# Patient Record
Sex: MALE | Race: WHITE | HISPANIC OR LATINO | Employment: UNEMPLOYED | URBAN - METROPOLITAN AREA
[De-identification: names, ages, dates, MRNs, and addresses within clinical notes are randomized per-mention and may not be internally consistent; named-entity substitution may affect disease eponyms.]

---

## 2018-03-11 ENCOUNTER — HOSPITAL ENCOUNTER (EMERGENCY)
Facility: HOSPITAL | Age: 33
Discharge: HOME | End: 2018-03-11

## 2018-03-11 VITALS
BODY MASS INDEX: 31.96 KG/M2 | RESPIRATION RATE: 20 BRPM | DIASTOLIC BLOOD PRESSURE: 71 MMHG | WEIGHT: 186.19 LBS | OXYGEN SATURATION: 95 % | SYSTOLIC BLOOD PRESSURE: 158 MMHG | HEART RATE: 93 BPM | TEMPERATURE: 99 F

## 2018-03-11 DIAGNOSIS — Z86.69 HISTORY OF ITCHING OF EYE: ICD-10-CM

## 2018-03-11 DIAGNOSIS — R03.0 ELEVATED BLOOD PRESSURE READING: ICD-10-CM

## 2018-03-11 DIAGNOSIS — H00.011 HORDEOLUM EXTERNUM OF RIGHT UPPER EYELID: Primary | ICD-10-CM

## 2018-03-11 PROCEDURE — 99283 EMERGENCY DEPT VISIT LOW MDM: CPT

## 2018-03-11 RX ORDER — POLYMYXIN B SULFATE AND TRIMETHOPRIM 1; 10000 MG/ML; [USP'U]/ML
1 SOLUTION OPHTHALMIC EVERY 4 HOURS
Qty: 10 ML | Refills: 0 | Status: SHIPPED | OUTPATIENT
Start: 2018-03-11 | End: 2018-03-21

## 2018-03-11 NOTE — ED PROVIDER NOTES
SCRIBE #1 NOTE: I, Beverley Garcia, am scribing for, and in the presence of, CARLY Lockett. I have scribed the entire note.      History      Chief Complaint   Patient presents with    Stye       Review of patient's allergies indicates:  No Known Allergies     HPI   HPI    3/11/2018, 3:34 PM   History obtained from the patient and wife      History of Present Illness: Maximino Martinez is a 32 y.o. male patient who presents to the Emergency Department for stye in R eyelid which onset 2-3 days ago. Symptoms are constant and moderate in severity. 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 Pt denies wearing contacts, glasses, or protective eye wear while working. No mitigating or exacerbating factors reported. Associated sxs include eye itching and redness in upper eye lid. Patient denies any eye discharge, eye pain, visual disturbance, n/v/d, CP, SOB, fever, chills, and all other sxs at this time. Prior Tx include OTC eye drops. No further complaints or concerns at this time.         Arrival mode: Personal vehicle     PCP: Primary Doctor No       Past Medical History:  History reviewed. No pertinent past medical history.    Past Surgical History:  Past Surgical History:   Procedure Laterality Date    FINGER SURGERY           Family History:  History reviewed. No pertinent family history.    Social History:  Social History     Social History Main Topics    Smoking status: Former Smoker    Smokeless tobacco: Never Used    Alcohol use Yes      Comment: socailly    Drug use: Unknown    Sexual activity: Not given       ROS   Review of Systems   Constitutional: Negative for chills and fever.   HENT: Negative for congestion and sore throat.    Eyes: Positive for redness and itching.        (+) Stye in R eye   Respiratory: Negative for cough and shortness of breath.    Cardiovascular: Negative for chest pain.   Gastrointestinal: Negative for diarrhea, nausea and  vomiting.   Genitourinary: Negative for dysuria.   Musculoskeletal: Negative for back pain.   Skin: Negative for rash.   Neurological: Negative for weakness.   Hematological: Does not bruise/bleed easily.   All other systems reviewed and are negative.      Physical Exam      Initial Vitals [03/11/18 1528]   BP Pulse Resp Temp SpO2   (!) 158/71 93 20 98.7 °F (37.1 °C) 95 %      MAP       100          Physical Exam  Nursing Notes and Vital Signs Reviewed.  Constitutional: Patient is in no acute distress. Well-developed and well-nourished.  Head: Atraumatic. Normocephalic.  Eyes: PERRL. EOM intact. Conjunctivae are not pale. No scleral icterus. Internal stye of upper R lid.  Lids everted and swept, no foreign bodies founds.  ENT: Mucous membranes are moist. Oropharynx is clear and symmetric.    Neck: Supple. Full ROM. No lymphadenopathy.  Cardiovascular: Regular rate. Regular rhythm. No murmurs, rubs, or gallops.   Pulmonary/Chest: No respiratory distress. Clear to auscultation bilaterally. No wheezing or rales.  Abdominal: Soft and non-distended.  There is no tenderness.  No rebound, guarding, or rigidity.   Musculoskeletal: Moves all extremities. No obvious deformities. No edema. No calf tenderness.  Skin: Warm and dry.  Neurological:  Alert, awake, and appropriate.  Normal speech.  No acute focal neurological deficits are appreciated.  Psychiatric: Normal affect. Good eye contact. Appropriate in content.    ED Course    Procedures  ED Vital Signs:  Vitals:    03/11/18 1528 03/11/18 1529   BP: (!) 158/71    Pulse: 93    Resp: 20    Temp: 98.7 °F (37.1 °C)    TempSrc: Oral    SpO2: 95%    Weight:  84.5 kg (186 lb 2.9 oz)              The Emergency Provider reviewed the vital signs and test results, which are outlined above.    ED Discussion     3:43 PM: Discussed with pt all pertinent ED information and results. Discussed pt dx and plan of tx. Gave pt all f/u and return to the ED instructions. All questions and  concerns were addressed at this time. Pt expresses understanding of information and instructions, and is comfortable with plan to discharge. Pt is stable for discharge.    Pre-hypertension/Hypertension: The pt has been informed that they may have pre-hypertension or hypertension based on a blood pressure reading in the ED. I recommend that the pt call the PCP listed on their discharge instructions or a physician of their choice this week to arrange f/u for further evaluation of possible pre-hypertension or hypertension.       ED Medication(s):  Medications - No data to display    Discharge Medication List as of 3/11/2018  3:47 PM      START taking these medications    Details   polymyxin B sulf-trimethoprim (POLYTRIM) 10,000 unit- 1 mg/mL Drop Place 1 drop into the right eye every 4 (four) hours., Starting Sun 3/11/2018, Until Wed 3/21/2018, Print             Follow-up Information     [facility] In 2 days.    Specialty:  Optometry  Contact information:  [address] [facility] [address] [phone]  Additional information:  (off [facility]) 1st Floor           [facility] In 2 days.    Contact information:  [address] [facility] [address] [address] [phone]                     Medical Decision Making              Scribe Attestation:   Scribe #1: I performed the above scribed service and the documentation accurately describes the services I performed. I attest to the accuracy of the note.    Attending:   Physician Attestation Statement for Scribe #1: I, CARLY Lockett, personally performed the services described in this documentation, as scribed by Beverley Garcia, in my presence, and it is both accurate and complete.          Clinical Impression       ICD-10-CM ICD-9-CM   1. Hordeolum externum of right upper eyelid H00.011 373.11   2. History of itching of eye Z86.69 V12.49   3. Elevated blood pressure reading R03.0 796.2       Disposition:   Disposition:  Discharged  Condition: Stable         Jeannie Tai PA-C  03/11/18 0861

## 2018-03-11 NOTE — ED NOTES
Patient seen, examined and discharged PA.Discharge instructions explained to patient. Patient verbalizes understanding. Patient and discharge paperwork escorted to registration desk by PA at this time. Discharge paperwork given to registration for completion of discharge.

## 2018-10-29 ENCOUNTER — HOSPITAL ENCOUNTER (EMERGENCY)
Facility: HOSPITAL | Age: 33
Discharge: ANOTHER HOSPITAL | End: 2018-10-30
Attending: EMERGENCY MEDICINE

## 2018-10-29 DIAGNOSIS — L03.211 FACIAL CELLULITIS: Primary | ICD-10-CM

## 2018-10-29 LAB
ALBUMIN SERPL BCP-MCNC: 4.2 G/DL
ALP SERPL-CCNC: 84 U/L
ALT SERPL W/O P-5'-P-CCNC: 285 U/L
ANION GAP SERPL CALC-SCNC: 9 MMOL/L
AST SERPL-CCNC: 70 U/L
BASOPHILS # BLD AUTO: 0.02 K/UL
BASOPHILS NFR BLD: 0.1 %
BILIRUB SERPL-MCNC: 0.9 MG/DL
BUN SERPL-MCNC: 9 MG/DL
CALCIUM SERPL-MCNC: 9.7 MG/DL
CHLORIDE SERPL-SCNC: 103 MMOL/L
CO2 SERPL-SCNC: 23 MMOL/L
CREAT SERPL-MCNC: 0.8 MG/DL
DIFFERENTIAL METHOD: ABNORMAL
EOSINOPHIL # BLD AUTO: 0 K/UL
EOSINOPHIL NFR BLD: 0.1 %
ERYTHROCYTE [DISTWIDTH] IN BLOOD BY AUTOMATED COUNT: 13.1 %
EST. GFR  (AFRICAN AMERICAN): >60 ML/MIN/1.73 M^2
EST. GFR  (NON AFRICAN AMERICAN): >60 ML/MIN/1.73 M^2
GLUCOSE SERPL-MCNC: 110 MG/DL
HCT VFR BLD AUTO: 40.7 %
HGB BLD-MCNC: 14.7 G/DL
LACTATE SERPL-SCNC: 1.7 MMOL/L
LYMPHOCYTES # BLD AUTO: 5.1 K/UL
LYMPHOCYTES NFR BLD: 26.6 %
MCH RBC QN AUTO: 31.3 PG
MCHC RBC AUTO-ENTMCNC: 36.1 G/DL
MCV RBC AUTO: 87 FL
MONOCYTES # BLD AUTO: 1.4 K/UL
MONOCYTES NFR BLD: 7.1 %
NEUTROPHILS # BLD AUTO: 12.6 K/UL
NEUTROPHILS NFR BLD: 66.1 %
PLATELET # BLD AUTO: 298 K/UL
PMV BLD AUTO: 10.2 FL
POTASSIUM SERPL-SCNC: 3.7 MMOL/L
PROCALCITONIN SERPL IA-MCNC: 0.38 NG/ML
PROT SERPL-MCNC: 8.8 G/DL
RBC # BLD AUTO: 4.7 M/UL
SODIUM SERPL-SCNC: 135 MMOL/L
WBC # BLD AUTO: 19.03 K/UL

## 2018-10-29 PROCEDURE — 25000003 PHARM REV CODE 250: Performed by: REGISTERED NURSE

## 2018-10-29 PROCEDURE — 96375 TX/PRO/DX INJ NEW DRUG ADDON: CPT

## 2018-10-29 PROCEDURE — 96366 THER/PROPH/DIAG IV INF ADDON: CPT

## 2018-10-29 PROCEDURE — 96365 THER/PROPH/DIAG IV INF INIT: CPT

## 2018-10-29 PROCEDURE — 85025 COMPLETE CBC W/AUTO DIFF WBC: CPT

## 2018-10-29 PROCEDURE — 80053 COMPREHEN METABOLIC PANEL: CPT

## 2018-10-29 PROCEDURE — 83605 ASSAY OF LACTIC ACID: CPT

## 2018-10-29 PROCEDURE — 96361 HYDRATE IV INFUSION ADD-ON: CPT

## 2018-10-29 PROCEDURE — 84145 PROCALCITONIN (PCT): CPT

## 2018-10-29 PROCEDURE — 63600175 PHARM REV CODE 636 W HCPCS: Performed by: REGISTERED NURSE

## 2018-10-29 PROCEDURE — 87040 BLOOD CULTURE FOR BACTERIA: CPT | Mod: 59

## 2018-10-29 PROCEDURE — 99285 EMERGENCY DEPT VISIT HI MDM: CPT | Mod: 25

## 2018-10-29 PROCEDURE — S0077 INJECTION, CLINDAMYCIN PHOSP: HCPCS | Performed by: REGISTERED NURSE

## 2018-10-29 PROCEDURE — 90471 IMMUNIZATION ADMIN: CPT | Performed by: EMERGENCY MEDICINE

## 2018-10-29 PROCEDURE — 63600175 PHARM REV CODE 636 W HCPCS: Performed by: EMERGENCY MEDICINE

## 2018-10-29 PROCEDURE — 90715 TDAP VACCINE 7 YRS/> IM: CPT | Performed by: EMERGENCY MEDICINE

## 2018-10-29 PROCEDURE — 25500020 PHARM REV CODE 255: Performed by: REGISTERED NURSE

## 2018-10-29 RX ORDER — IBUPROFEN 800 MG/1
800 TABLET ORAL
Status: DISCONTINUED | OUTPATIENT
Start: 2018-10-29 | End: 2018-10-30 | Stop reason: HOSPADM

## 2018-10-29 RX ORDER — CLINDAMYCIN PHOSPHATE 600 MG/50ML
600 INJECTION, SOLUTION INTRAVENOUS ONCE
Status: COMPLETED | OUTPATIENT
Start: 2018-10-29 | End: 2018-10-29

## 2018-10-29 RX ORDER — ONDANSETRON 2 MG/ML
4 INJECTION INTRAMUSCULAR; INTRAVENOUS
Status: COMPLETED | OUTPATIENT
Start: 2018-10-29 | End: 2018-10-29

## 2018-10-29 RX ORDER — MORPHINE SULFATE 4 MG/ML
4 INJECTION, SOLUTION INTRAMUSCULAR; INTRAVENOUS
Status: COMPLETED | OUTPATIENT
Start: 2018-10-29 | End: 2018-10-29

## 2018-10-29 RX ADMIN — SODIUM CHLORIDE 2500 ML: 0.9 INJECTION, SOLUTION INTRAVENOUS at 10:10

## 2018-10-29 RX ADMIN — ONDANSETRON 4 MG: 2 INJECTION, SOLUTION INTRAMUSCULAR; INTRAVENOUS at 08:10

## 2018-10-29 RX ADMIN — CLOSTRIDIUM TETANI TOXOID ANTIGEN (FORMALDEHYDE INACTIVATED), CORYNEBACTERIUM DIPHTHERIAE TOXOID ANTIGEN (FORMALDEHYDE INACTIVATED), BORDETELLA PERTUSSIS TOXOID ANTIGEN (GLUTARALDEHYDE INACTIVATED), BORDETELLA PERTUSSIS FILAMENTOUS HEMAGGLUTININ ANTIGEN (FORMALDEHYDE INACTIVATED), BORDETELLA PERTUSSIS PERTACTIN ANTIGEN, AND BORDETELLA PERTUSSIS FIMBRIAE 2/3 ANTIGEN 0.5 ML: 5; 2; 2.5; 5; 3; 5 INJECTION, SUSPENSION INTRAMUSCULAR at 11:10

## 2018-10-29 RX ADMIN — MORPHINE SULFATE 4 MG: 4 INJECTION INTRAVENOUS at 08:10

## 2018-10-29 RX ADMIN — IOHEXOL 75 ML: 350 INJECTION, SOLUTION INTRAVENOUS at 09:10

## 2018-10-29 RX ADMIN — CLINDAMYCIN IN 5 PERCENT DEXTROSE 600 MG: 12 INJECTION, SOLUTION INTRAVENOUS at 08:10

## 2018-10-30 VITALS
HEIGHT: 65 IN | BODY MASS INDEX: 31.04 KG/M2 | TEMPERATURE: 99 F | WEIGHT: 186.31 LBS | SYSTOLIC BLOOD PRESSURE: 120 MMHG | DIASTOLIC BLOOD PRESSURE: 79 MMHG | HEART RATE: 80 BPM | RESPIRATION RATE: 18 BRPM | OXYGEN SATURATION: 98 %

## 2018-10-30 PROCEDURE — 63600175 PHARM REV CODE 636 W HCPCS: Performed by: EMERGENCY MEDICINE

## 2018-10-30 RX ORDER — HYDROMORPHONE HYDROCHLORIDE 2 MG/ML
1 INJECTION, SOLUTION INTRAMUSCULAR; INTRAVENOUS; SUBCUTANEOUS
Status: COMPLETED | OUTPATIENT
Start: 2018-10-30 | End: 2018-10-30

## 2018-10-30 RX ADMIN — HYDROMORPHONE HYDROCHLORIDE 1 MG: 2 INJECTION INTRAMUSCULAR; INTRAVENOUS; SUBCUTANEOUS at 12:10

## 2018-10-30 NOTE — ED NOTES
Accepting Dr. Daly at [facility] [phone] for report, Spoke with Lidia from the transfer

## 2018-10-30 NOTE — ED PROVIDER NOTES
"SCRIBE #1 NOTE: I, Giovanna Concepcion, am scribing for, and in the presence of, Dwaine Leiva Jr, NP. I have scribed the HPI, ROS, and PEx.    SCRIBE #2 NOTE: I, Alize Montes, am scribing for, and in the presence of,  Young Brower MD. I have scribed the remaining portions of the note not scribed by Scribe #1.      History     Chief Complaint   Patient presents with    Dental Pain     Pt states, "I have a bad tooth."     Review of patient's allergies indicates:  No Known Allergies      History of Present Illness     HPI    10/29/2018, 7:03 PM  History obtained from the spouse who is translating for patient      History of Present Illness: Maximino Martinez is a 33 y.o. male Tamazight speaking patient who presents to the Emergency Department for evaluation of R sided facial swelling with associated R sided dental pain. Spouse reports that patient had dental pain all day yesterday but woke up this morning with facial swelling that progressively worsened throughout the day. She reports that patient had similar sxs before and "had something cut" inside his mouth. Patient took Tylenol today for pain w/o relief. Pt has a HA, is unable to completely open his mouth, and is febrile (102.8) in the ED. He has not eaten today because eating exacerbates the pain. No mitigating factors reported. Pt denies chills, N/V/D, drooling, sore throat, HA, facial erythema, and all other sxs at this time.      Arrival mode: Personal vehicle    PCP: Primary Doctor No        Past Medical History:  History reviewed. No pertinent past medical history.    Past Surgical History:  Past Surgical History:   Procedure Laterality Date    ADJACENT TISSUE TRANSFER/REARRANGEMENT Right 6/1/2015    Performed by [facility]ur [facility] Sr[facility] at [facility] OR    FINGER SURGERY      IRRIGATION AND DEBRIDEMENT UPPER EXTREMITY Right 6/1/2015    Performed by [facility] Sr[facility] at [facility] OR    TENOSYNOVECTOMY Right 6/1/2015    Performed by Artur Coyle Sr., MD " at [facility] OR         Family History:  History reviewed. No pertinent family history.    Social History:  Social History     Tobacco Use    Smoking status: Former Smoker    Smokeless tobacco: Never Used   Substance and Sexual Activity    Alcohol use: Yes     Comment: socailly    Drug use: Unknown    Sexual activity: Unknown        Review of Systems     Review of Systems   Constitutional: Positive for fever. Negative for chills.   HENT: Positive for dental problem and facial swelling. Negative for drooling, sore throat, trouble swallowing and voice change.    Respiratory: Negative for shortness of breath.    Cardiovascular: Negative for chest pain.   Gastrointestinal: Negative for diarrhea, nausea and vomiting.   Genitourinary: Negative for dysuria.   Musculoskeletal: Negative for back pain.   Skin: Negative for color change and rash.   Neurological: Positive for headaches. Negative for seizures, syncope, weakness and numbness.   Hematological: Does not bruise/bleed easily.   All other systems reviewed and are negative.     Physical Exam     Initial Vitals [10/29/18 1818]   BP Pulse Resp Temp SpO2   (!) 157/94 105 20 (!) 102.8 °F (39.3 °C) 96 %      MAP       --          Physical Exam  Nursing Notes and Vital Signs Reviewed.  Constitutional: Patient is in no acute distress. Well-developed and well-nourished.  Head: Atraumatic. Normocephalic.  Eyes: PERRL. EOM intact. Conjunctivae are not pale. No scleral icterus.  Mouth/Throat: Marked R sided facial swelling. Trismus. Exam limited secondary to trismus.  Neck: Supple. Full ROM. No lymphadenopathy.  Cardiovascular: Regular rate. Regular rhythm. No murmurs, rubs, or gallops.   Pulmonary/Chest: No respiratory distress. Clear to auscultation bilaterally. No wheezing or rales.  Abdominal: Non-distended.  Musculoskeletal: Moves all extremities. No obvious deformities.   Skin: Warm and dry.  Neurological:  Alert, awake, and appropriate.  Normal speech.  No acute focal  "neurological deficits are appreciated.  Psychiatric: Normal affect. Good eye contact. Appropriate in content.     ED Course   Procedures  ED Vital Signs:  Vitals:    10/29/18 1818 10/29/18 2302 10/30/18 0036 10/30/18 0154   BP: (!) 157/94 135/84 121/81 120/79   Pulse: 105 84 84 80   Resp: 20 18 18   Temp: (!) 102.8 °F (39.3 °C) 99.5 °F (37.5 °C) 99.6 °F (37.6 °C) 99.4 °F (37.4 °C)   TempSrc: Oral Oral Oral    SpO2: 96%  98%    Weight: 84.5 kg (186 lb 4.6 oz)      Height: 5' 5" (1.651 m)          Abnormal Lab Results:  Labs Reviewed   CBC W/ AUTO DIFFERENTIAL - Abnormal; Notable for the following components:       Result Value    WBC 19.03 (*)     MCH 31.3 (*)     MCHC 36.1 (*)     Gran # (ANC) 12.6 (*)     Lymph # 5.1 (*)     Mono # 1.4 (*)     All other components within normal limits   COMPREHENSIVE METABOLIC PANEL - Abnormal; Notable for the following components:    Sodium 135 (*)     Total Protein 8.8 (*)     AST 70 (*)      (*)     All other components within normal limits   PROCALCITONIN - Abnormal; Notable for the following components:    Procalcitonin 0.38 (*)     All other components within normal limits   CULTURE, BLOOD   CULTURE, BLOOD   LACTIC ACID, PLASMA        All Lab Results:  Results for orders placed or performed during the hospital encounter of 10/29/18   CBC auto differential   Result Value Ref Range    WBC 19.03 (H) 3.90 - 12.70 K/uL    RBC 4.70 4.60 - 6.20 M/uL    Hemoglobin 14.7 14.0 - 18.0 g/dL    Hematocrit 40.7 40.0 - 54.0 %    MCV 87 82 - 98 fL    MCH 31.3 (H) 27.0 - 31.0 pg    MCHC 36.1 (H) 32.0 - 36.0 g/dL    RDW 13.1 11.5 - 14.5 %    Platelets 298 150 - 350 K/uL    MPV 10.2 9.2 - 12.9 fL    Gran # (ANC) 12.6 (H) 1.8 - 7.7 K/uL    Lymph # 5.1 (H) 1.0 - 4.8 K/uL    Mono # 1.4 (H) 0.3 - 1.0 K/uL    Eos # 0.0 0.0 - 0.5 K/uL    Baso # 0.02 0.00 - 0.20 K/uL    Gran% 66.1 38.0 - 73.0 %    Lymph% 26.6 18.0 - 48.0 %    Mono% 7.1 4.0 - 15.0 %    Eosinophil% 0.1 0.0 - 8.0 %    Basophil% " 0.1 0.0 - 1.9 %    Differential Method Automated    Comprehensive metabolic panel   Result Value Ref Range    Sodium 135 (L) 136 - 145 mmol/L    Potassium 3.7 3.5 - 5.1 mmol/L    Chloride 103 95 - 110 mmol/L    CO2 23 23 - 29 mmol/L    Glucose 110 70 - 110 mg/dL    BUN, Bld 9 6 - 20 mg/dL    Creatinine 0.8 0.5 - 1.4 mg/dL    Calcium 9.7 8.7 - 10.5 mg/dL    Total Protein 8.8 (H) 6.0 - 8.4 g/dL    Albumin 4.2 3.5 - 5.2 g/dL    Total Bilirubin 0.9 0.1 - 1.0 mg/dL    Alkaline Phosphatase 84 55 - 135 U/L    AST 70 (H) 10 - 40 U/L     (H) 10 - 44 U/L    Anion Gap 9 8 - 16 mmol/L    eGFR if African American >60 >60 mL/min/1.73 m^2    eGFR if non African American >60 >60 mL/min/1.73 m^2   Lactic acid, plasma   Result Value Ref Range    Lactate (Lactic Acid) 1.7 0.5 - 2.2 mmol/L   Procalcitonin   Result Value Ref Range    Procalcitonin 0.38 (H) <0.25 ng/mL       Imaging Results:  Imaging Results          CT Maxillofacial With Contrast (Final result)  Result time 10/29/18 22:26:39    Final result by Davion Alves MD (10/29/18 22:26:39)                 Impression:      As above.      Electronically signed by: Davion Alves  Date:    10/29/2018  Time:    22:26             Narrative:    EXAMINATION:  CT MAXILLOFACIAL WITH CONTRAST    CLINICAL HISTORY:  Mass, lump or swelling, maxface;    TECHNIQUE:  CT maxillofacial with IV contrast 75 mL Omnipaque 350.    COMPARISON:  None    FINDINGS:  Soft tissue edema overlying the right maxillofacial region.  There is mucosal thickening within the right maxillary sinus.  There is inflammation about the right mandible and maxilla.  Findings suggest odontogenic sinusitis.  No focal oral or deep facial or tonsillar abscess identified.  There are reactive lymph nodes in the right retromandibular region.                                  The Emergency Provider reviewed the vital signs and test results, which are outlined above.     ED Discussion     10:06 PM: Dwaine Leiva Jr, NP  transfers care of pt to Dr. Brower, pending CT results.    11:24 PM: Dr. Browre evaluated pt. Wife at bedside. Pt is resting comfortably and is in no acute distress. Informed pt and family that there are no OMFS services available at this time. I have discussed test results, shared treatment plan, and the need for transfer with patient and family at bedside. All historical, clinical, radiographic, and laboratory findings were reviewed with the patient/family in detail. Patient will be transferred by [facility] services with IV care required en route. Patient understands that there could be unforeseen motor vehicle accidents or loss of vital signs that could result in potential death or permanent disability. Pt and family express understanding at this time and agree with all information. All questions answered. Pt and family have no further questions or concerns at this time. Pt is ready for transfer.     12:20 AM: Consult with house sup at [facility] who states she will discuss patient's case with the ER physician.    12:35 AM: Consult with Dr. Daly (ER Physician) at [facility] ER concerning pt. There are no OMFS services, which the patient requires, offered at [facility] at this time. Dr. Daly expresses understanding and will accept transfer.  Accepting Facility: [facility]  Accepting Physician: Dr. Daly      ED Medication(s):  Medications   ibuprofen tablet 800 mg (800 mg Oral Not Given 10/29/18 2000)   clindamycin 600 MG/50 ML D5W 600 mg/50 mL IVPB 600 mg (0 mg Intravenous Stopped 10/29/18 2319)   morphine injection 4 mg (4 mg Intravenous Given 10/29/18 2054)   ondansetron injection 4 mg (4 mg Intravenous Given 10/29/18 2053)   omnipaque 350 iohexol 75 mL (75 mLs Intravenous Given 10/29/18 2148)   sodium chloride 0.9% bolus 2,500 mL (0 mLs Intravenous Stopped 10/29/18 2357)   Tdap vaccine injection 0.5 mL (0.5 mLs Intramuscular Given 10/29/18 2355)   hydromorphone (PF) injection 1 mg (1 mg Intravenous Given  10/30/18 0042)        Medical Decision Making     Medical Decision Making:   Clinical Tests:   Lab Tests: Ordered and Reviewed  Radiological Study: Ordered and Reviewed             Scribe Attestation:   Scribe #1: I performed the above scribed service and the documentation accurately describes the services I performed. I attest to the accuracy of the note.    Attending:   Physician Attestation Statement for Scribe #1: I, Dwaine Leiva Jr, NP, personally performed the services described in this documentation, as scribed by Giovanna Concepcion, in my presence, and it is both accurate and complete.       Scribe Attestation:   Scribe #2: I performed the above scribed service and the documentation accurately describes the services I performed. I attest to the accuracy of the note.    Attending Attestation:           Physician Attestation for Scribe:    Physician Attestation Statement for Scribe #2: I, Young Brower MD, reviewed documentation, as scribed by Alize Montes in my presence, and it is both accurate and complete. I also acknowledge and confirm the content of the note done by Scribe #1.           Clinical Impression       ICD-10-CM ICD-9-CM   1. Facial cellulitis L03.211 682.0       Disposition:   Disposition: Transferred  Condition: Stable  Reason for referral: No FS services available at this time               Young Brower MD  10/30/18 0427

## 2018-11-04 LAB
BACTERIA BLD CULT: NORMAL
BACTERIA BLD CULT: NORMAL

## 2019-08-27 ENCOUNTER — HOSPITAL ENCOUNTER (EMERGENCY)
Facility: HOSPITAL | Age: 34
Discharge: HOME | End: 2019-08-27
Attending: EMERGENCY MEDICINE

## 2019-08-27 VITALS
SYSTOLIC BLOOD PRESSURE: 145 MMHG | WEIGHT: 191.25 LBS | OXYGEN SATURATION: 96 % | BODY MASS INDEX: 36.11 KG/M2 | HEIGHT: 61 IN | DIASTOLIC BLOOD PRESSURE: 112 MMHG | RESPIRATION RATE: 18 BRPM | HEART RATE: 101 BPM | TEMPERATURE: 99 F

## 2019-08-27 DIAGNOSIS — K04.7 DENTAL ABSCESS: Primary | ICD-10-CM

## 2019-08-27 DIAGNOSIS — K08.89 TOOTHACHE: ICD-10-CM

## 2019-08-27 PROCEDURE — 99284 EMERGENCY DEPT VISIT MOD MDM: CPT

## 2019-08-27 RX ORDER — PENICILLIN V POTASSIUM 500 MG/1
500 TABLET, FILM COATED ORAL EVERY 6 HOURS
Qty: 28 TABLET | Refills: 0 | Status: SHIPPED | OUTPATIENT
Start: 2019-08-27 | End: 2019-09-03

## 2019-08-27 RX ORDER — NAPROXEN 375 MG/1
375 TABLET ORAL 2 TIMES DAILY WITH MEALS
Qty: 30 TABLET | Refills: 0 | Status: SHIPPED | OUTPATIENT
Start: 2019-08-27

## 2019-08-27 RX ORDER — TRAMADOL HYDROCHLORIDE 50 MG/1
50 TABLET ORAL EVERY 6 HOURS PRN
Qty: 10 TABLET | Refills: 0 | Status: SHIPPED | OUTPATIENT
Start: 2019-08-27 | End: 2019-09-06

## 2019-08-27 NOTE — ED PROVIDER NOTES
SCRIBE #1 NOTE: I, Get Garcia, am scribing for, and in the presence of, Janusz Altamirano MD. I have scribed the entire note.      History      Chief Complaint   Patient presents with    Dental Problem     right upper swelling noted to cheek, pt has history of dental abscesses but has no insurance and has been unable to get tooth pulles       Review of patient's allergies indicates:  No Known Allergies     HPI   HPI    8/27/2019, 9:09 AM   History obtained from the patient      History of Present Illness: Maximino Martinez is a 34 y.o. male patient with a PMHx of dental abscess who presents to the Emergency Department for R-sided dental pain, onset several days PTA. Symptoms are constant and moderate in severity. No mitigating or exacerbating factors reported. Associated sxs include R-sided facial swelling. Patient denies any fever, chills, n/v/d, SOB, CP, weakness, numbness, dizziness, headache, and all other sxs at this time. Prior Tx includes Amoxcillin. Pt states that he does not have insurance and this cannot see a dentist to have his tooth removed. No further complaints or concerns at this time.     Arrival mode: Personal vehicle     PCP: Primary Doctor No       Past Medical History:  No past medical history on file.    Past Surgical History:  Past Surgical History:   Procedure Laterality Date    ADJACENT TISSUE TRANSFER/REARRANGEMENT Right 6/1/2015    Performed by [facility]ur [facility]oyle Sr., [facility] at [facility] OR    FINGER SURGERY      IRRIGATION AND DEBRIDEMENT UPPER EXTREMITY Right 6/1/2015    Performed by [facility] Sr[facility] at [facility] OR    TENOSYNOVECTOMY Right 6/1/2015    Performed by [facility]oyle Sr[facility] at [facility] OR         Family History:  No family history on file.    Social History:  Social History     Tobacco Use    Smoking status: Former Smoker    Smokeless tobacco: Never Used   Substance and Sexual Activity    Alcohol use: Yes     Comment: socailly    Drug use: Not on file    Sexual  activity: Not on file       ROS   Review of Systems   Constitutional: Negative for chills, diaphoresis, fatigue and fever.   HENT: Positive for dental problem (R facial swelling and R dental pain). Negative for sore throat.    Respiratory: Negative for shortness of breath.    Cardiovascular: Negative for chest pain.   Gastrointestinal: Negative for nausea and vomiting.   Genitourinary: Negative for dysuria.   Musculoskeletal: Negative for back pain.   Skin: Negative for rash and wound.   Neurological: Negative for dizziness, weakness, light-headedness, numbness and headaches.   Hematological: Does not bruise/bleed easily.     Physical Exam      Initial Vitals [08/27/19 0905]   BP Pulse Resp Temp SpO2   (!) 145/112 101 18 99.1 °F (37.3 °C) 96 %      MAP       --          Physical Exam  Nursing Notes and Vital Signs Reviewed.  Constitutional: Patient is in no acute distress. Well-developed and well-nourished.  Head: Atraumatic. Normocephalic.  Eyes: PERRL. EOM intact. Conjunctivae are not pale. No scleral icterus.  ENT: Mucous membranes are moist. Oropharynx is clear and symmetric.    Mouth: R upper jaw swelling. Poor dentition. Patient handles secretions normally. No palpable fluctuance. No evidence of periodontal or periapical abscess. No trismus.   Neck: Supple. Full ROM. No lymphadenopathy.  Cardiovascular: Regular rate. Regular rhythm. No murmurs, rubs, or gallops. Distal pulses are 2+ and symmetric.  Pulmonary/Chest: No respiratory distress. Clear to auscultation bilaterally. No wheezing or rales.  Abdominal: Soft and non-distended.  There is no tenderness.  No rebound, guarding, or rigidity.   Musculoskeletal: Moves all extremities. No obvious deformities. No edema. No calf tenderness.  Skin: Warm and dry.  Neurological:  Alert, awake, and appropriate.  Normal speech.  No acute focal neurological deficits are appreciated.  Psychiatric: Normal affect. Good eye contact. Appropriate in content.    ED Course   "  Procedures  ED Vital Signs:  Vitals:    08/27/19 0905   BP: (!) 145/112   Pulse: 101   Resp: 18   Temp: 99.1 °F (37.3 °C)   TempSrc: Oral   SpO2: 96%   Weight: 86.7 kg (191 lb 4 oz)   Height: 5' 1" (1.549 m)              The Emergency Provider reviewed the vital signs and test results, which are outlined above.    ED Discussion     9:13 AM:  Discussed with pt all pertinent ED information. Discussed pt dx and plan of tx. Gave pt all f/u and return to the ED instructions. All questions and concerns were addressed at this time. Pt expresses understanding of information and instructions, and is comfortable with plan to discharge. Pt is stable for discharge.    I discussed with patient and/or family/caretaker that evaluation in the ED does not suggest any emergent or life threatening medical conditions requiring immediate intervention beyond what was provided in the ED, and I believe patient is safe for discharge.  Regardless, an unremarkable evaluation in the ED does not preclude the development or presence of a serious of life threatening condition. As such, patient was instructed to return immediately for any worsening or change in current symptoms.    ED Medication(s):  Medications - No data to display    Follow-up Information     [facility] In 2 days.    Contact information:  [address] [facility] [address] [phone]                  Discharge Medication List as of 8/27/2019  9:11 AM      START taking these medications    Details   naproxen (NAPROSYN) 375 MG tablet Take 1 tablet (375 mg total) by mouth 2 (two) times daily with meals., Starting Tue 8/27/2019, Print      penicillin v potassium (VEETID) 500 MG tablet Take 1 tablet (500 mg total) by mouth every 6 (six) hours. for 7 days, Starting Tue 8/27/2019, Until Tue 9/3/2019, Print               Medical Decision Making              Scribe Attestation:   Scribe #1: I performed the above scribed service and the documentation accurately " describes the services I performed. I attest to the accuracy of the note.    Attending:   Physician Attestation Statement for Scribe #1: I, Janusz Altamirano MD, personally performed the services described in this documentation, as scribed by Get Garcia, in my presence, and it is both accurate and complete.          Clinical Impression       ICD-10-CM ICD-9-CM   1. Dental abscess K04.7 522.5   2. Toothache K08.89 525.9       Disposition:   Disposition: Discharged  Condition: Stable         Janusz Altamirano MD  08/27/19 0958